# Patient Record
Sex: MALE | Race: WHITE | URBAN - METROPOLITAN AREA
[De-identification: names, ages, dates, MRNs, and addresses within clinical notes are randomized per-mention and may not be internally consistent; named-entity substitution may affect disease eponyms.]

---

## 2021-12-16 ENCOUNTER — OFFICE VISIT (OUTPATIENT)
Dept: URGENT CARE | Age: 46
End: 2021-12-16
Payer: COMMERCIAL

## 2021-12-16 VITALS — RESPIRATION RATE: 15 BRPM | TEMPERATURE: 97.8 F | OXYGEN SATURATION: 97 % | HEART RATE: 85 BPM

## 2021-12-16 DIAGNOSIS — Z20.822 EXPOSURE TO COVID-19 VIRUS: Primary | ICD-10-CM

## 2021-12-16 LAB — SARS-COV-2 POC: NEGATIVE

## 2021-12-16 PROCEDURE — 99202 OFFICE O/P NEW SF 15 MIN: CPT | Performed by: FAMILY MEDICINE

## 2021-12-16 PROCEDURE — 87426 SARSCOV CORONAVIRUS AG IA: CPT | Performed by: FAMILY MEDICINE

## 2021-12-16 NOTE — PROGRESS NOTES
This patient was seen at [address] [place] [address] [facility]gent [facility] while in their vehicle due to COVID-19 pandemic with PPE and focused examination in order to decrease community viral transmission. The patient/guardian gave verbal consent to treat. Juventino Jacobs is a 42 y.o. male who presents for COVID-19 testing. Was exposed to COVID-19 by co-worker. Denies cough, fever, SOB, N/V/D. The history is provided by the patient. History reviewed. No pertinent past medical history. Past Surgical History:   Procedure Laterality Date    HX GYN      HX ORTHOPAEDIC           History reviewed. No pertinent family history. Social History     Socioeconomic History    Marital status: UNKNOWN     Spouse name: Not on file    Number of children: Not on file    Years of education: Not on file    Highest education level: Not on file   Occupational History    Not on file   Tobacco Use    Smoking status: Current Every Day Smoker    Smokeless tobacco: Never Used   Substance and Sexual Activity    Alcohol use: Not on file    Drug use: Not on file    Sexual activity: Not on file   Other Topics Concern    Not on file   Social History Narrative    Not on file     Social Determinants of Health     Financial Resource Strain:     Difficulty of Paying Living Expenses: Not on file   Food Insecurity:     Worried About Running Out of Food in the Last Year: Not on file    Mario of Food in the Last Year: Not on file   Transportation Needs:     Lack of Transportation (Medical): Not on file    Lack of Transportation (Non-Medical):  Not on file   Physical Activity:     Days of Exercise per Week: Not on file    Minutes of Exercise per Session: Not on file   Stress:     Feeling of Stress : Not on file   Social Connections:     Frequency of Communication with Friends and Family: Not on file    Frequency of Social Gatherings with Friends and Family: Not on file    Attends Holiness Services: Not on file   [place] Member of Clubs or Organizations: Not on file    Attends Club or Organization Meetings: Not on file    Marital Status: Not on file   Intimate Partner Violence:     Fear of Current or Ex-Partner: Not on file    Emotionally Abused: Not on file    Physically Abused: Not on file    Sexually Abused: Not on file   Housing Stability:     Unable to Pay for Housing in the Last Year: Not on file    Number of [place] in the Last Year: Not on file    Unstable Housing in the Last Year: Not on file                ALLERGIES: Erythromycin    Review of Systems   Constitutional: Negative for fever. Respiratory: Negative for cough and shortness of breath. Gastrointestinal: Negative for diarrhea, nausea and vomiting. Vitals:    12/16/21 1126   Pulse: 85   Resp: 15   Temp: 97.8 °F (36.6 °C)   SpO2: 97%       Physical Exam  Vitals and nursing note reviewed. Constitutional:       General: He is not in acute distress. Appearance: He is well-developed. He is not diaphoretic. Pulmonary:      Effort: Pulmonary effort is normal.   Neurological:      Mental Status: He is alert. Psychiatric:         Behavior: Behavior normal.         Thought Content: Thought content normal.         Judgment: Judgment normal.         MDM    ICD-10-CM ICD-9-CM   1. Exposure to COVID-19 virus  Z20.822 V01.79       Orders Placed This Encounter    AMB POC SARS-COV-2 ANTIGEN     Order Specific Question:   Is this test for diagnosis or screening? Answer:   Screening     Order Specific Question:   Symptomatic for COVID-19 as defined by CDC? Answer:   No     Order Specific Question:   Hospitalized for COVID-19? Answer:   No     Order Specific Question:   Admitted to ICU for COVID-19? 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      Answer:   No     Order Specific Question:   Previously tested for COVID-19? Answer:   Unknown        .   Results for orders placed or performed in visit on 12/16/21   AMB POC SARS-COV-2   Result Value Ref Range    SARS-COV-2 POC Negative Negative       Procedures

## 2021-12-26 ENCOUNTER — OFFICE VISIT (OUTPATIENT)
Dept: URGENT CARE | Age: 46
End: 2021-12-26
Payer: COMMERCIAL

## 2021-12-26 VITALS — HEART RATE: 87 BPM | OXYGEN SATURATION: 98 % | RESPIRATION RATE: 19 BRPM | TEMPERATURE: 98.7 F

## 2021-12-26 DIAGNOSIS — Z20.822 ENCOUNTER FOR LABORATORY TESTING FOR COVID-19 VIRUS: Primary | ICD-10-CM

## 2021-12-26 LAB — SARS-COV-2 POC: NEGATIVE

## 2021-12-26 PROCEDURE — 87426 SARSCOV CORONAVIRUS AG IA: CPT | Performed by: FAMILY MEDICINE

## 2021-12-26 PROCEDURE — 99213 OFFICE O/P EST LOW 20 MIN: CPT | Performed by: FAMILY MEDICINE

## 2021-12-26 NOTE — PROGRESS NOTES
Patient presents with a request for COVID Testing. He was exposed to a COVID positive person. He is asymptomatic. He needs two negative tests to return to work. This patient was seen in [facility] at [address] [place] [address] [facility] while in their vehicle due to COVID-19 pandemic with PPE and focused examination in order to decrease community viral transmission. The patient/guardian gave verbal consent to treat. History reviewed. No pertinent past medical history. Past Surgical History:   Procedure Laterality Date    HX GYN      HX ORTHOPAEDIC           History reviewed. No pertinent family history. Social History     Socioeconomic History    Marital status: UNKNOWN     Spouse name: Not on file    Number of children: Not on file    Years of education: Not on file    Highest education level: Not on file   Occupational History    Not on file   Tobacco Use    Smoking status: Current Every Day Smoker    Smokeless tobacco: Never Used   Substance and Sexual Activity    Alcohol use: Not on file    Drug use: Not on file    Sexual activity: Not on file   Other Topics Concern    Not on file   Social History Narrative    Not on file     Social Determinants of Health     Financial Resource Strain:     Difficulty of Paying Living Expenses: Not on file   Food Insecurity:     Worried About Running Out of Food in the Last Year: Not on file    Mario of Food in the Last Year: Not on file   Transportation Needs:     Lack of Transportation (Medical): Not on file    Lack of Transportation (Non-Medical):  Not on file   Physical Activity:     Days of Exercise per Week: Not on file    Minutes of Exercise per Session: Not on file   Stress:     Feeling of Stress : Not on file   Social Connections:     Frequency of Communication with Friends and Family: Not on file    Frequency of Social Gatherings with Friends and Family: Not on file    Attends [employer] Services: Not on file   [facility] Active Member of Clubs or Organizations: Not on file    Attends Club or Organization Meetings: Not on file    Marital Status: Not on file   Intimate Partner Violence:     Fear of Current or Ex-Partner: Not on file    Emotionally Abused: Not on file    Physically Abused: Not on file    Sexually Abused: Not on file   Housing Stability:     Unable to Pay for Housing in the Last Year: Not on file    Number of [place] in the Last Year: Not on file    Unstable Housing in the Last Year: Not on file                ALLERGIES: Erythromycin    Review of Systems   Constitutional: Negative for activity change, appetite change, chills and fever. HENT: Negative for congestion, postnasal drip, rhinorrhea, sinus pressure and sinus pain. Respiratory: Negative for cough, chest tightness and shortness of breath. Cardiovascular: Negative for chest pain. Gastrointestinal: Negative for diarrhea, nausea and vomiting. There were no vitals filed for this visit. Physical Exam  Constitutional:       General: He is not in acute distress. Appearance: He is well-developed. He is not ill-appearing. HENT:      Head: Normocephalic. Right Ear: Ear canal normal. No drainage. Tympanic membrane is not erythematous or bulging. Left Ear: Tympanic membrane and ear canal normal. No drainage. Tympanic membrane is not erythematous or bulging. Nose: Nose normal. No rhinorrhea. Mouth/Throat:      Pharynx: No oropharyngeal exudate or posterior oropharyngeal erythema. Cardiovascular:      Rate and Rhythm: Normal rate and regular rhythm. Heart sounds: Normal heart sounds. Pulmonary:      Effort: Pulmonary effort is normal. No respiratory distress. Breath sounds: Normal breath sounds. No decreased breath sounds or rhonchi. Lymphadenopathy:      Cervical: No cervical adenopathy. MDM    ICD-10-CM ICD-9-CM    1.  Encounter for laboratory testing for COVID-19 virus  Z20.822 V01.79 AMB POC SARS-COV-2     No orders of the defined types were placed in this encounter. No results found for any visits on 12/26/21. The patients condition was discussed with the patient and they understand. The patient is to follow up with primary care doctor. If signs and symptoms become worse the pt is to go to the ER. The patient is to take medications as prescribed.      Procedures